# Patient Record
Sex: FEMALE | HISPANIC OR LATINO | URBAN - NONMETROPOLITAN AREA
[De-identification: names, ages, dates, MRNs, and addresses within clinical notes are randomized per-mention and may not be internally consistent; named-entity substitution may affect disease eponyms.]

---

## 2018-10-11 ENCOUNTER — OFFICE VISIT (OUTPATIENT)
Dept: URBAN - NONMETROPOLITAN AREA CLINIC 9 | Facility: CLINIC | Age: 83
End: 2018-10-11
Payer: COMMERCIAL

## 2018-10-11 DIAGNOSIS — H04.123 DRY EYE SYNDROME OF BILATERAL LACRIMAL GLANDS: Primary | ICD-10-CM

## 2018-10-11 DIAGNOSIS — Z96.1 PRESENCE OF PSEUDOPHAKIA: ICD-10-CM

## 2018-10-11 DIAGNOSIS — H40.1134 PRIMARY OPEN-ANGLE GLAUCOMA, BILATERAL, INDETERMINATE STAGE: ICD-10-CM

## 2018-10-11 DIAGNOSIS — H34.8120 CENTRAL RETINAL VEIN OCCLUSION, LEFT EYE, WITH MACULAR EDEMA: ICD-10-CM

## 2018-10-11 PROCEDURE — 92014 COMPRE OPH EXAM EST PT 1/>: CPT | Performed by: OPTOMETRIST

## 2018-10-11 RX ORDER — CARBOXYMETHYLCELLULOSE SODIUM 10 MG/ML
1 % GEL OPHTHALMIC
Qty: 1 | Refills: 11 | Status: ACTIVE
Start: 2018-10-11

## 2018-10-11 ASSESSMENT — INTRAOCULAR PRESSURE
OD: 22
OS: 24

## 2018-10-11 NOTE — IMPRESSION/PLAN
Impression: Dry eye syndrome of bilateral lacrimal glands: H04.123. Plan: Discussed diagnosis with patient. Recommend artificial tears for comfort. Refresh Liquigel Qid Ou.

## 2018-10-11 NOTE — IMPRESSION/PLAN
Impression: Primary open-angle glaucoma, bilateral, indeterminate stage: H40.1134. Plan:  Iop elevated OS but due to LP vision OS. Borderline Iop OD monitor. due to start drops.

## 2018-10-11 NOTE — IMPRESSION/PLAN
Impression: Central retinal vein occlusion, left eye, with macular edema: H34.8120. Plan: Extensive scarring due to CRVO resulting in loss vision. Pt did not go to retina as instructed.